# Patient Record
Sex: FEMALE | Race: WHITE | NOT HISPANIC OR LATINO | Employment: UNEMPLOYED | URBAN - METROPOLITAN AREA
[De-identification: names, ages, dates, MRNs, and addresses within clinical notes are randomized per-mention and may not be internally consistent; named-entity substitution may affect disease eponyms.]

---

## 2020-02-19 ENCOUNTER — OFFICE VISIT (OUTPATIENT)
Dept: ORTHOPEDIC SURGERY | Facility: CLINIC | Age: 80
End: 2020-02-19

## 2020-02-19 VITALS — WEIGHT: 143 LBS | TEMPERATURE: 98.3 F | HEIGHT: 61 IN | BODY MASS INDEX: 27 KG/M2

## 2020-02-19 DIAGNOSIS — M17.11 ARTHRITIS OF RIGHT KNEE: Primary | ICD-10-CM

## 2020-02-19 DIAGNOSIS — Z96.652 STATUS POST TOTAL LEFT KNEE REPLACEMENT: ICD-10-CM

## 2020-02-19 DIAGNOSIS — M06.9 RHEUMATOID ARTHRITIS INVOLVING RIGHT KNEE, UNSPECIFIED RHEUMATOID FACTOR PRESENCE: ICD-10-CM

## 2020-02-19 PROCEDURE — 99203 OFFICE O/P NEW LOW 30 MIN: CPT | Performed by: ORTHOPAEDIC SURGERY

## 2020-02-19 PROCEDURE — 20610 DRAIN/INJ JOINT/BURSA W/O US: CPT | Performed by: ORTHOPAEDIC SURGERY

## 2020-02-19 RX ORDER — ACETAMINOPHEN 500 MG
TABLET ORAL
COMMUNITY
Start: 2015-08-07

## 2020-02-19 RX ORDER — GABAPENTIN 100 MG/1
CAPSULE ORAL
COMMUNITY
Start: 2015-08-07

## 2020-02-19 RX ORDER — SULFASALAZINE 500 MG/1
TABLET ORAL
COMMUNITY
Start: 2020-01-17

## 2020-02-19 RX ORDER — FLUTICASONE PROPIONATE 50 MCG
SPRAY, SUSPENSION (ML) NASAL
COMMUNITY
Start: 2019-12-11

## 2020-02-19 RX ORDER — METHYLPREDNISOLONE ACETATE 80 MG/ML
80 INJECTION, SUSPENSION INTRA-ARTICULAR; INTRALESIONAL; INTRAMUSCULAR; SOFT TISSUE
Status: COMPLETED | OUTPATIENT
Start: 2020-02-19 | End: 2020-02-19

## 2020-02-19 RX ORDER — MINOXIDIL 2.5 MG/1
TABLET ORAL
COMMUNITY
Start: 2019-12-11

## 2020-02-19 RX ORDER — CLONIDINE HYDROCHLORIDE 0.2 MG/1
TABLET ORAL
COMMUNITY
Start: 2019-12-11

## 2020-02-19 RX ORDER — HYDROCHLOROTHIAZIDE 50 MG/1
TABLET ORAL DAILY
COMMUNITY
Start: 2019-12-11

## 2020-02-19 RX ORDER — TRAMADOL HYDROCHLORIDE 50 MG/1
TABLET ORAL
COMMUNITY
Start: 2019-12-11

## 2020-02-19 RX ORDER — METOPROLOL SUCCINATE 100 MG/1
TABLET, EXTENDED RELEASE ORAL DAILY
COMMUNITY
Start: 2019-12-11

## 2020-02-19 RX ORDER — LOSARTAN POTASSIUM 100 MG/1
TABLET ORAL DAILY
COMMUNITY
Start: 2019-12-11

## 2020-02-19 RX ADMIN — METHYLPREDNISOLONE ACETATE 80 MG: 80 INJECTION, SUSPENSION INTRA-ARTICULAR; INTRALESIONAL; INTRAMUSCULAR; SOFT TISSUE at 15:02

## 2020-02-19 NOTE — PROGRESS NOTES
"Patient Name: Nidia Franco   YOB: 1940  Referring Primary Care Physician: Denzel Sanchez MD  BMI: Body mass index is 27.02 kg/m².    Chief Complaint:    Chief Complaint   Patient presents with   • Right Knee - Pain, Establish Care        HPI:     Nidia Franco is a 80 y.o. female who presents today for evaluation of   Chief Complaint   Patient presents with   • Right Knee - Pain, Establish Care   . Hx of RA with left tkr.  Right acute on chronic achy right knee pain.  Rest, ice.  No tx. previous history of injections many years ago that no longer helped and she is been just kind of \"toughing it out\"    This problem is new to this examiner.     Subjective   Medications:   Home Medications:  Current Outpatient Medications on File Prior to Visit   Medication Sig   • acetaminophen (TYLENOL) 500 MG tablet Take  by mouth.   • cloNIDine (CATAPRES) 0.2 MG tablet TK ONE T PO QAM AND TWO TS PO QHS   • fluticasone (FLONASE) 50 MCG/ACT nasal spray SPRAY ONCE IEN QD   • gabapentin (NEURONTIN) 100 MG capsule Take  by mouth.   • hydroCHLOROthiazide (HYDRODIURIL) 50 MG tablet Take  by mouth Daily.   • losartan (COZAAR) 100 MG tablet Take  by mouth Daily.   • metoprolol succinate XL (TOPROL-XL) 100 MG 24 hr tablet Take  by mouth Daily.   • minoxidil (LONITEN) 2.5 MG tablet TK 1 T PO QAM AND 2 TS PO QPM   • sulfaSALAzine (AZULFIDINE) 500 MG tablet TK 3 TS PO BID   • traMADol (ULTRAM) 50 MG tablet TK 1 T PO QD PRN P     No current facility-administered medications on file prior to visit.      Current Medications:  Scheduled Meds:  Continuous Infusions:  No current facility-administered medications for this visit.   PRN Meds:.    I have reviewed the patient's medical history in detail and updated the computerized patient record.  Review and summarization of old records includes:    Past Medical History:   Diagnosis Date   • Colitis    • Hypertension    • Loss of bladder control    • Osteoarthritis    • " "Rheumatoid arthritis (CMS/[facility])         Past Surgical History:   Procedure Laterality Date   •  SECTION         • OTHER SURGICAL HISTORY      LEFT KNEE         Social History     Occupational History   • Not on file   Tobacco Use   • Smoking status: Never Smoker   • Smokeless tobacco: Never Used   Substance and Sexual Activity   • Alcohol use: No   • Drug use: Not on file   • Sexual activity: Not on file    Social History     Social History Narrative   • Not on file        Family History   Problem Relation Age of Onset   • Heart disease Other    • Diabetes Other    • Hypertension Other        ROS: 14 point review of systems was performed and all other systems were reviewed and are negative except for documented findings in HPI and today's encounter.     Allergies: No Known Allergies  Constitutional:  Denies fever, shaking or chills   Eyes:  Denies change in visual acuity   HENT:  Denies nasal congestion or sore throat   Respiratory:  Denies cough or shortness of breath   Cardiovascular:  Denies chest pain or severe LE edema   GI:  Denies abdominal pain, nausea, vomiting, bloody stools or diarrhea   Musculoskeletal:  Numbness, tingling, pain, or loss of motor function only as noted above in history of present illness.  : Denies painful urination or hematuria  Integument:  Denies rash, lesion or ulceration   Neurologic:  Denies headache or focal weakness  Endocrine:  Denies lymphadenopathy  Psych:  Denies confusion or change in mental status   Hem:  Denies active bleeding    OBJECTIVE:  Physical Exam: 80 y.o. female  Wt Readings from Last 3 Encounters:   20 64.9 kg (143 lb)     Ht Readings from Last 1 Encounters:   20 154.9 cm (61\")     Body mass index is 27.02 kg/m².  Vitals:    20 1435   Temp: 98.3 °F (36.8 °C)     Vital signs reviewed.     General Appearance:    Alert, cooperative, in no acute distress                  Eyes: conjunctiva clear  ENT: external ears and nose " "atraumatic  CV: no peripheral edema  Resp: normal respiratory effort  Skin: no rashes or wounds; normal turgor  Psych: mood and affect appropriate  Lymph: no nodes appreciated  Neuro: gross sensation intact  Vascular:  Palpable peripheral pulse in noted extremity  Musculoskeletal Extremities: Exam today shows pleasant lady she has crepitation synovitis swelling and stiffness in her right knee joint line tenderness and she walks with a limp her left knee has good range of motion and stability a little stiff at end point she had a total knee probably more than 15 years ago is done well for her    Radiology:   AP lateral 40 degree PA right knee taken the office today and compared x-rays about 5 years ago shows interval development of essential tricompartmental inflammatory arthritic change with \"bone-on-bone\" and relative osteopenia the visualized portions of her left total knee appear to be in order.    Assessment:     ICD-10-CM ICD-9-CM   1. Arthritis of right knee M17.11 716.96   2. Status post total left knee replacement Z96.652 V43.65        Large Joint Arthrocentesis: R knee  Date/Time: 2/19/2020 3:02 PM  Consent given by: patient  Site marked: site marked  Timeout: Immediately prior to procedure a time out was called to verify the correct patient, procedure, equipment, support staff and site/side marked as required   Supporting Documentation  Indications: pain and joint swelling   Procedure Details  Location: knee - R knee  Preparation: Patient was prepped and draped in the usual sterile fashion  Needle gauge: 21.  Approach: anterolateral  Medications administered: 80 mg methylPREDNISolone acetate 80 MG/ML; 4 mL lidocaine (cardiac)  Patient tolerance: patient tolerated the procedure well with no immediate complications             Plan: Biomechanics of pertinent body area discussed.  Risks, benefits, alternatives, comparisons, and complications of accepted medicines, injections, recommendations, surgical " procedures, and therapies explained and education provided in laymen's terms. Natural history and expected course of this patient's diagnosis discussed along with evaluation of therapies. Questions answered. When appropriate I also discussed proper use of cane, walker, trekking poles.   BMI:  The concept of BMI body mass index and its importance and implications discussed.  BMI suggested to be < 40 or as low as possible. Lifestyle measures for weight loss and how this affects orthopedic condition.  EXERCISES:  Advice on benefits of, and types of regular/moderate exercise including biomechanical forces involved as it pertains to this complaint.  MEDICATIONS:  Prescription, OTC and Monitoring of Medications per orders to address ortho complaints; Evaluation and discussion of safety, precautions, side effects, and warnings given especially of long term NSAID or steroid therapy.    Cortisone Injection. See procedure note.  I talked her about knee replacement surgery and procedure risk benefits complications pointed out she had done well before.  The injection stopped helping when her knee looked better but she says that since her son passed she does not have anybody to really help her get through surgery I did go over the procedure risk benefits complications and answered her questions and she said she may want to do it if the shot does not help.  She could also use heat ice and the like she will let us know what her next steps would that she would desire.      2/19/2020    Much of this encounter note is an electronic transcription/translation of spoken language to printed text. The electronic translation of spoken language may permit erroneous, or at times, nonsensical words or phrases to be inadvertently transcribed; Although I have reviewed the note for such errors, some may still exist